# Patient Record
Sex: MALE | Race: WHITE | NOT HISPANIC OR LATINO | URBAN - METROPOLITAN AREA
[De-identification: names, ages, dates, MRNs, and addresses within clinical notes are randomized per-mention and may not be internally consistent; named-entity substitution may affect disease eponyms.]

---
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
## 2022-07-01 ENCOUNTER — OFFICE VISIT (OUTPATIENT)
Dept: INTERNAL MEDICINE CLINIC | Facility: CLINIC | Age: 22
End: 2022-07-01
Payer: COMMERCIAL

## 2022-07-01 VITALS
SYSTOLIC BLOOD PRESSURE: 100 MMHG | HEART RATE: 93 BPM | TEMPERATURE: 99.7 F | HEIGHT: 71 IN | BODY MASS INDEX: 20.55 KG/M2 | DIASTOLIC BLOOD PRESSURE: 66 MMHG | OXYGEN SATURATION: 99 % | WEIGHT: 146.8 LBS

## 2022-07-01 DIAGNOSIS — K64.9 HEMORRHOIDS, UNSPECIFIED HEMORRHOID TYPE: Primary | ICD-10-CM

## 2022-07-01 DIAGNOSIS — Z11.59 NEED FOR HEPATITIS C SCREENING TEST: ICD-10-CM

## 2022-07-01 DIAGNOSIS — Z23 ENCOUNTER FOR IMMUNIZATION: ICD-10-CM

## 2022-07-01 DIAGNOSIS — Z11.4 SCREENING FOR HIV (HUMAN IMMUNODEFICIENCY VIRUS): ICD-10-CM

## 2022-07-01 PROCEDURE — 99214 OFFICE O/P EST MOD 30 MIN: CPT

## 2022-07-01 PROCEDURE — 3725F SCREEN DEPRESSION PERFORMED: CPT

## 2022-07-01 RX ORDER — HYDROCORTISONE 25 MG/G
CREAM TOPICAL 2 TIMES DAILY
Qty: 28 G | Refills: 3 | Status: SHIPPED | OUTPATIENT
Start: 2022-07-01

## 2022-07-01 NOTE — PROGRESS NOTES
INTERNAL MEDICINE FOLLOW-UP VISIT  [facility] - [facility] SUSAN LAINEZ    NAME: Ad Mcginnis  AGE: 22 y o  SEX: male  : 2000     DATE: 2022     Assessment and Plan:   Hemorrhoid  Pain, burning, and itching before and after bowel movements  Use Anusol topically twice a day  Maintain hydration  May use a 1/2 spoon full of coconut oil in coffee to help produce more ease with bowel movement  Bowel movements should be the consistency of soft serve ice cream      Return if symptoms worsen or fail to improve  Chief Complaint:     Chief Complaint   Patient presents with    Well Check     Wants to talk with physician about something personal      History of Present Illness:     Patient presents today in the office with complaints of burning, pain, itching before and after he has a bowel movement right in his rectum  He denies any blood when he has a bowel movement  He denies any anal penetration  He does admit that his bowel movements are hard and he does not drink enough water  He has been trying preparation H which she says is moderately effective  The following portions of the patient's history were reviewed and updated as appropriate: allergies, current medications, past family history, past medical history, past social history, past surgical history and problem list      Review of Systems:     Review of Systems   Constitutional: Negative for appetite change, chills, diaphoresis, fatigue, fever and unexpected weight change  HENT: Negative for postnasal drip and sneezing  Eyes: Negative for visual disturbance  Respiratory: Negative for chest tightness and shortness of breath  Cardiovascular: Negative for chest pain, palpitations and leg swelling  Gastrointestinal: Positive for constipation and rectal pain  Negative for abdominal pain and blood in stool  Endocrine: Negative for cold intolerance, heat intolerance, polydipsia, polyphagia and polyuria  Genitourinary: Negative for difficulty urinating, dysuria, frequency and urgency  Musculoskeletal: Negative for arthralgias and myalgias  Skin: Negative for rash and wound  Neurological: Negative for dizziness, weakness, light-headedness and headaches  Hematological: Negative for adenopathy  Psychiatric/Behavioral: Negative for confusion, dysphoric mood and sleep disturbance  The patient is not nervous/anxious  Past Medical History:     Past Medical History:   Diagnosis Date    Atopic dermatitis     Contact dermatitis due to plant     Contact dermatitis due to poison ivy     Eczema     Fracture of phalanx of left ring finger 04/16/2016    ER        Current Medications:     Current Outpatient Medications:     hydrocortisone (ANUSOL-HC) 2 5 % rectal cream, Apply topically 2 (two) times a day, Disp: 28 g, Rfl: 3     Allergies: Allergies   Allergen Reactions    Other Swelling     BEES    Poison Ivy Extract Rash        Physical Exam:     /66 (BP Location: Left arm, Patient Position: Sitting, Cuff Size: Standard) Comment: bp  Pulse 93   Temp 99 7 °F (37 6 °C) (Temporal) Comment: no  Ht 5' 10 5" (1 791 m)   Wt 66 6 kg (146 lb 12 8 oz)   SpO2 99%   BMI 20 77 kg/m²     Physical Exam  Constitutional:       Appearance: He is well-developed  HENT:      Head: Normocephalic and atraumatic  Eyes:      Conjunctiva/sclera: Conjunctivae normal       Pupils: Pupils are equal, round, and reactive to light  Cardiovascular:      Rate and Rhythm: Normal rate and regular rhythm  Heart sounds: Normal heart sounds  Pulmonary:      Effort: Pulmonary effort is normal       Breath sounds: Normal breath sounds  Abdominal:      General: Bowel sounds are normal       Palpations: Abdomen is soft  Musculoskeletal:         General: Normal range of motion  Cervical back: Normal range of motion  Skin:     General: Skin is warm and dry     Neurological:      Mental Status: He is alert and oriented to person, place, and time          MISSY Maloney  [facility]

## 2022-07-21 ENCOUNTER — TELEPHONE (OUTPATIENT)
Dept: INTERNAL MEDICINE CLINIC | Facility: CLINIC | Age: 22
End: 2022-07-21